# Patient Record
(demographics unavailable — no encounter records)

---

## 2025-04-23 NOTE — HISTORY OF PRESENT ILLNESS
[Yes] : Patient has concerns regarding sex [Currently Active] : currently active [FreeTextEntry1] : decreased libiod